# Patient Record
Sex: MALE | Race: WHITE
[De-identification: names, ages, dates, MRNs, and addresses within clinical notes are randomized per-mention and may not be internally consistent; named-entity substitution may affect disease eponyms.]

---

## 2021-03-27 ENCOUNTER — HOSPITAL ENCOUNTER (EMERGENCY)
Dept: HOSPITAL 26 - MED | Age: 31
Discharge: HOME | End: 2021-03-27
Payer: MEDICAID

## 2021-03-27 VITALS — SYSTOLIC BLOOD PRESSURE: 150 MMHG | DIASTOLIC BLOOD PRESSURE: 86 MMHG

## 2021-03-27 VITALS — DIASTOLIC BLOOD PRESSURE: 86 MMHG | SYSTOLIC BLOOD PRESSURE: 150 MMHG

## 2021-03-27 VITALS — HEIGHT: 73 IN | WEIGHT: 291 LBS | BODY MASS INDEX: 38.57 KG/M2

## 2021-03-27 DIAGNOSIS — Z79.899: ICD-10-CM

## 2021-03-27 DIAGNOSIS — L05.01: Primary | ICD-10-CM

## 2021-03-27 PROCEDURE — 96372 THER/PROPH/DIAG INJ SC/IM: CPT

## 2021-03-27 PROCEDURE — 10080 I&D PILONIDAL CYST SIMPLE: CPT

## 2021-03-27 PROCEDURE — 99283 EMERGENCY DEPT VISIT LOW MDM: CPT

## 2021-03-29 ENCOUNTER — HOSPITAL ENCOUNTER (EMERGENCY)
Dept: HOSPITAL 26 - MED | Age: 31
Discharge: HOME | End: 2021-03-29
Payer: MEDICAID

## 2021-03-29 VITALS — WEIGHT: 280 LBS | BODY MASS INDEX: 37.11 KG/M2 | HEIGHT: 73 IN

## 2021-03-29 VITALS — DIASTOLIC BLOOD PRESSURE: 72 MMHG | SYSTOLIC BLOOD PRESSURE: 128 MMHG

## 2021-03-29 VITALS — SYSTOLIC BLOOD PRESSURE: 135 MMHG | DIASTOLIC BLOOD PRESSURE: 74 MMHG

## 2021-03-29 DIAGNOSIS — Z48.00: ICD-10-CM

## 2021-03-29 DIAGNOSIS — L05.01: Primary | ICD-10-CM

## 2021-03-29 NOTE — NUR
29 y/o M coming in from home with c/c wound recheck. Patient states he was here 
two days ago for an pilonidal cyst I&D procedure. Pt states he came in for 
wound recheck and for packing removal per discharge instructions. Pt states 
pain alleviated when standing; states pain 6/10 when standing, 7/10 sitting. Pt 
denies nausea, vomiting, fever, chills, cold-like symptoms, dizziness, 
weakness. Pt states he has been taking prescribed antibiotics and pain 
medication as prescribed. Pt placed onto blood pressure cuff and pulse ox. Bed 
locked in lowest position, side rails x 1. 



PMH/Meds/Sx: Denies

NK